# Patient Record
Sex: FEMALE | Race: WHITE | Employment: STUDENT | URBAN - METROPOLITAN AREA
[De-identification: names, ages, dates, MRNs, and addresses within clinical notes are randomized per-mention and may not be internally consistent; named-entity substitution may affect disease eponyms.]

---
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
## 2020-02-04 ENCOUNTER — HOSPITAL ENCOUNTER (OUTPATIENT)
Dept: LAB | Age: 20
Discharge: HOME | End: 2020-02-04
Payer: COMMERCIAL

## 2020-02-04 ENCOUNTER — OFFICE VISIT (OUTPATIENT)
Dept: FAMILY MEDICINE CLINIC | Age: 20
End: 2020-02-04
Payer: COMMERCIAL

## 2020-02-04 VITALS
WEIGHT: 148.8 LBS | DIASTOLIC BLOOD PRESSURE: 78 MMHG | HEIGHT: 71 IN | RESPIRATION RATE: 16 BRPM | BODY MASS INDEX: 20.83 KG/M2 | TEMPERATURE: 97.1 F | OXYGEN SATURATION: 99 % | HEART RATE: 57 BPM | SYSTOLIC BLOOD PRESSURE: 126 MMHG

## 2020-02-04 LAB
ALBUMIN SERPL-MCNC: 5.1 G/DL (ref 3.5–4.6)
ALP BLD-CCNC: 81 U/L (ref 40–130)
ALT SERPL-CCNC: 17 U/L (ref 0–33)
ANION GAP SERPL CALCULATED.3IONS-SCNC: 14 MEQ/L (ref 9–15)
AST SERPL-CCNC: 22 U/L (ref 0–35)
BASOPHILS ABSOLUTE: 0.1 K/UL (ref 0–0.2)
BASOPHILS RELATIVE PERCENT: 0.8 %
BILIRUB SERPL-MCNC: 0.5 MG/DL (ref 0.2–0.7)
BUN BLDV-MCNC: 10 MG/DL (ref 6–20)
CALCIUM SERPL-MCNC: 10 MG/DL (ref 8.5–9.9)
CHLORIDE BLD-SCNC: 103 MEQ/L (ref 95–107)
CO2: 23 MEQ/L (ref 20–31)
CREAT SERPL-MCNC: 0.61 MG/DL (ref 0.5–0.9)
EOSINOPHILS ABSOLUTE: 0.1 K/UL (ref 0–0.7)
EOSINOPHILS RELATIVE PERCENT: 1.5 %
FERRITIN: 34.3 NG/ML (ref 13–150)
FOLATE: 15.1 NG/ML (ref 7.3–26.1)
GFR AFRICAN AMERICAN: >60
GFR NON-AFRICAN AMERICAN: >60
GLOBULIN: 3.1 G/DL (ref 2.3–3.5)
GLUCOSE BLD-MCNC: 87 MG/DL (ref 70–99)
HCT VFR BLD CALC: 40.5 % (ref 37–47)
HEMOGLOBIN: 13.7 G/DL (ref 12–16)
IRON SATURATION: 37 % (ref 11–46)
IRON: 125 UG/DL (ref 37–145)
LYMPHOCYTES ABSOLUTE: 1.5 K/UL (ref 1–4.8)
LYMPHOCYTES RELATIVE PERCENT: 23.7 %
MCH RBC QN AUTO: 30.8 PG (ref 27–31.3)
MCHC RBC AUTO-ENTMCNC: 33.9 % (ref 33–37)
MCV RBC AUTO: 90.6 FL (ref 82–100)
MONOCYTES ABSOLUTE: 0.5 K/UL (ref 0.2–0.8)
MONOCYTES RELATIVE PERCENT: 8.5 %
NEUTROPHILS ABSOLUTE: 4.1 K/UL (ref 1.4–6.5)
NEUTROPHILS RELATIVE PERCENT: 65.5 %
PDW BLD-RTO: 12.9 % (ref 11.5–14.5)
PLATELET # BLD: 247 K/UL (ref 130–400)
POTASSIUM SERPL-SCNC: 4.4 MEQ/L (ref 3.4–4.9)
RBC # BLD: 4.47 M/UL (ref 4.2–5.4)
SODIUM BLD-SCNC: 140 MEQ/L (ref 135–144)
TOTAL IRON BINDING CAPACITY: 337 UG/DL (ref 178–450)
TOTAL PROTEIN: 8.2 G/DL (ref 6.3–8)
VITAMIN B-12: 749 PG/ML (ref 232–1245)
WBC # BLD: 6.3 K/UL (ref 4.5–11)

## 2020-02-04 PROCEDURE — 82746 ASSAY OF FOLIC ACID SERUM: CPT

## 2020-02-04 PROCEDURE — 82728 ASSAY OF FERRITIN: CPT

## 2020-02-04 PROCEDURE — 85025 COMPLETE CBC W/AUTO DIFF WBC: CPT

## 2020-02-04 PROCEDURE — 99395 PREV VISIT EST AGE 18-39: CPT | Performed by: NURSE PRACTITIONER

## 2020-02-04 PROCEDURE — 83540 ASSAY OF IRON: CPT

## 2020-02-04 PROCEDURE — 36415 COLL VENOUS BLD VENIPUNCTURE: CPT

## 2020-02-04 PROCEDURE — 80053 COMPREHEN METABOLIC PANEL: CPT

## 2020-02-04 PROCEDURE — 82607 VITAMIN B-12: CPT

## 2020-02-04 PROCEDURE — 83550 IRON BINDING TEST: CPT

## 2020-02-04 ASSESSMENT — PATIENT HEALTH QUESTIONNAIRE - PHQ9
1. LITTLE INTEREST OR PLEASURE IN DOING THINGS: 0
2. FEELING DOWN, DEPRESSED OR HOPELESS: 0
SUM OF ALL RESPONSES TO PHQ9 QUESTIONS 1 & 2: 0
SUM OF ALL RESPONSES TO PHQ QUESTIONS 1-9: 0
SUM OF ALL RESPONSES TO PHQ QUESTIONS 1-9: 0

## 2020-02-04 NOTE — PROGRESS NOTES
Annual Physical    Gaudencio Johns  presents for annual physical exam.  She has no complaints today. She states that she has been having some issues with mental fatigue and difficulty concentrating. She is currently in [employer] in [employer]. She would like to have some blood work done especially to check B12 level and iron. She states that she had her thyroid checked last year and it was fine. She is concerned about the possible cost of blood work and would like to keep things to a minimum. She states that she does not have any other issues. Does have some irritable bowel syndrome but symptoms have been well controlled lately. No significant constipation or abdominal discomfort reported. Health Maintenance: PAP test discussed  Mammograms discussed  Tetanus shot discussed    Past Medical History:   Diagnosis Date    Irritable bowel syndrome with constipation 3/8/2019     No past surgical history on file. family history includes Arrhythmia in her paternal grandfather; Coronary Art Dis in her paternal grandfather; Heart Attack (age of onset: 36) in her father; No Known Problems in her brother; Rheum Arthritis in her maternal grandfather and paternal grandmother. Social History     Tobacco Use    Smoking status: Never Smoker    Smokeless tobacco: Never Used   Substance Use Topics    Alcohol use: Yes     Frequency: Monthly or less     Drinks per session: 1 or 2     Binge frequency: Never    Drug use: No       REVIEW OF SYSTEMS:  The patient reports no problems with hearing or headaches. She reports no problems with vision. She is advised to have a yearly eye examination. She has no chest pains or pressures, or shortness of breath. She has no indigestion, heartburn, nausea, or vomiting. She has no constipation, diarrhea, or  black, bloody, mucousy, or tarry stool. She has no trouble passing urine or losing urine.   Libido seems to be normal.  She reports no musculoskeletal aches or pains. PHYSICAL EXAMINATION:  EXAM:  Constitutional Blood pressure 126/78, pulse 57, temperature 97.1 °F (36.2 °C), temperature source Temporal, resp. rate 16, height 5' 10.5\" (1.791 m), weight 148 lb 12.8 oz (67.5 kg), SpO2 99 %. .  She has a normal affect, no acute distress, appears well developed and well nourished. The patient has normal affect. MENTAL STATUS:  Alert and oriented x3. HEENT:  Pupils equal and reactive. Extraocular eye motions intact and pain free. Sclerae and conjunctivae are clear. Tympanic membranes are normal.  Ear canals normal.  Ears are normal.  Pharynx clear. No masses visible. NECK:  Supple without mass, adenopathy, or bruit. HEART:  Regular rhythm without  murmur, rub, or gallop. RESPIRATORY:  Clear and equal breath sounds with no acute respiratory distress. ABDOMEN:  Soft, nontender. No masses, guarding, or rebound. No bruits. Bowel sounds are normal.  EXTREMITIES:  With symmetric strength in all four extremities. No edema. No joint deformities. SKIN:  No suspicious skin lesions or nodules. NEURO:  No focal deficits. Symmetric reflexes. DIAGNOSIS:    Diagnosis Orders   1. Difficulty concentrating  Vitamin B12    Ferritin    Iron And Tibc    Folate   2. Routine general medical examination at a health care facility  CBC Auto Differential    Comprehensive Metabolic Panel       PLAN: Include orders in the DX section. physical exam findings normal today. CBC and CMP ordered as preventative lab. Other labs ordered and associated with difficulty concentrating. Lab may be able to inform her if a specific lab may not be covered by her insurance. Patient can also call insurance company to see if blood work will be covered. Follow-up with PCP as needed.     Please note this report has been partially produced using speech recognition software and may cause contain errors related to that system including grammar, punctuation and spelling as well as

## 2020-02-06 NOTE — RESULT ENCOUNTER NOTE
Please notify [facility] Manzo that overall, lab results are normal. No evidence of vitamin deficiency or anemia. If symptoms persist, additional blood work or referral can be done. Follow up as scheduled and as needed.

## 2020-06-12 ENCOUNTER — HOSPITAL ENCOUNTER (OUTPATIENT)
Age: 20
Discharge: HOME | End: 2020-06-14
Payer: COMMERCIAL

## 2020-06-12 ENCOUNTER — HOSPITAL ENCOUNTER (OUTPATIENT)
Dept: GENERAL RADIOLOGY | Age: 20
Discharge: HOME | End: 2020-06-14
Payer: COMMERCIAL

## 2020-06-12 PROCEDURE — 72050 X-RAY EXAM NECK SPINE 4/5VWS: CPT

## 2020-06-12 PROCEDURE — 72110 X-RAY EXAM L-2 SPINE 4/>VWS: CPT

## 2020-06-12 PROCEDURE — 72072 X-RAY EXAM THORAC SPINE 3VWS: CPT
